# Patient Record
Sex: FEMALE | Race: ASIAN
[De-identification: names, ages, dates, MRNs, and addresses within clinical notes are randomized per-mention and may not be internally consistent; named-entity substitution may affect disease eponyms.]

---

## 2017-07-06 ENCOUNTER — HOSPITAL ENCOUNTER (EMERGENCY)
Dept: HOSPITAL 74 - JER | Age: 37
Discharge: HOME | End: 2017-07-06
Payer: COMMERCIAL

## 2017-07-06 VITALS — BODY MASS INDEX: 21.3 KG/M2

## 2017-07-06 DIAGNOSIS — N83.202: ICD-10-CM

## 2017-07-06 DIAGNOSIS — R10.32: Primary | ICD-10-CM

## 2017-07-06 LAB
ALBUMIN SERPL-MCNC: 4.2 G/DL (ref 3.4–5)
ALP SERPL-CCNC: 40 U/L (ref 45–117)
ALT SERPL-CCNC: 15 U/L (ref 12–78)
ANION GAP SERPL CALC-SCNC: 7 MMOL/L (ref 8–16)
APPEARANCE UR: CLEAR
AST SERPL-CCNC: 17 U/L (ref 15–37)
BASOPHILS # BLD: 0.9 % (ref 0–2)
BILIRUB SERPL-MCNC: 1 MG/DL (ref 0.2–1)
BILIRUB UR STRIP.AUTO-MCNC: NEGATIVE MG/DL
CALCIUM SERPL-MCNC: 9.1 MG/DL (ref 8.5–10.1)
CO2 SERPL-SCNC: 26 MMOL/L (ref 21–32)
COLOR UR: (no result)
CREAT SERPL-MCNC: 0.7 MG/DL (ref 0.55–1.02)
DEPRECATED RDW RBC AUTO: 13.6 % (ref 11.6–15.6)
EOSINOPHIL # BLD: 7 % (ref 0–4.5)
GLUCOSE SERPL-MCNC: 90 MG/DL (ref 74–106)
KETONES UR QL STRIP: NEGATIVE
LEUKOCYTE ESTERASE UR QL STRIP.AUTO: (no result)
MCH RBC QN AUTO: 29.8 PG (ref 25.7–33.7)
MCHC RBC AUTO-ENTMCNC: 33.4 G/DL (ref 32–36)
MCV RBC: 89.2 FL (ref 80–96)
NEUTROPHILS # BLD: 51.1 % (ref 42.8–82.8)
NITRITE UR QL STRIP: NEGATIVE
PH UR: 7 [PH] (ref 5–8)
PLATELET # BLD AUTO: 200 K/MM3 (ref 134–434)
PMV BLD: 8.5 FL (ref 7.5–11.1)
PROT SERPL-MCNC: 7 G/DL (ref 6.4–8.2)
PROT UR QL STRIP: NEGATIVE
PROT UR QL STRIP: NEGATIVE
RBC # BLD AUTO: 1 /HPF (ref 0–3)
RBC # UR STRIP: NEGATIVE /UL
SP GR UR: 1.01 (ref 1–1.02)
UROBILINOGEN UR STRIP-MCNC: (no result) E.U./DL (ref 0.2–1)
WBC # BLD AUTO: 9.4 K/MM3 (ref 4–10)
WBC # UR AUTO: 20 /HPF (ref 3–5)

## 2017-07-06 PROCEDURE — 3E0333Z INTRODUCTION OF ANTI-INFLAMMATORY INTO PERIPHERAL VEIN, PERCUTANEOUS APPROACH: ICD-10-PCS
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
## 2017-07-07 VITALS — TEMPERATURE: 98.5 F | SYSTOLIC BLOOD PRESSURE: 120 MMHG | DIASTOLIC BLOOD PRESSURE: 73 MMHG | HEART RATE: 62 BPM

## 2019-10-24 ENCOUNTER — HOSPITAL ENCOUNTER (INPATIENT)
Dept: HOSPITAL 74 - JER | Age: 39
LOS: 1 days | Discharge: HOME | DRG: 387 | End: 2019-10-25
Attending: FAMILY MEDICINE | Admitting: FAMILY MEDICINE
Payer: COMMERCIAL

## 2019-10-24 VITALS — BODY MASS INDEX: 22.1 KG/M2

## 2019-10-24 DIAGNOSIS — N83.201: ICD-10-CM

## 2019-10-24 DIAGNOSIS — R10.31: ICD-10-CM

## 2019-10-24 DIAGNOSIS — K50.00: Primary | ICD-10-CM

## 2019-10-24 DIAGNOSIS — D72.1: ICD-10-CM

## 2019-10-24 LAB
ALBUMIN SERPL-MCNC: 4.3 G/DL (ref 3.4–5)
ALP SERPL-CCNC: 49 U/L (ref 45–117)
ALT SERPL-CCNC: 15 U/L (ref 13–61)
ANION GAP SERPL CALC-SCNC: 7 MMOL/L (ref 8–16)
APPEARANCE UR: CLEAR
AST SERPL-CCNC: 15 U/L (ref 15–37)
BASOPHILS # BLD: 0.9 % (ref 0–2)
BILIRUB SERPL-MCNC: 0.6 MG/DL (ref 0.2–1)
BILIRUB UR STRIP.AUTO-MCNC: NEGATIVE MG/DL
BUN SERPL-MCNC: 15.9 MG/DL (ref 7–18)
CALCIUM SERPL-MCNC: 9.1 MG/DL (ref 8.5–10.1)
CHLORIDE SERPL-SCNC: 106 MMOL/L (ref 98–107)
CO2 SERPL-SCNC: 27 MMOL/L (ref 21–32)
COLOR UR: YELLOW
CREAT SERPL-MCNC: 0.8 MG/DL (ref 0.55–1.3)
DEPRECATED RDW RBC AUTO: 13.2 % (ref 11.6–15.6)
EOSINOPHIL # BLD: 12.3 % (ref 0–4.5)
GLUCOSE SERPL-MCNC: 83 MG/DL (ref 74–106)
HCT VFR BLD CALC: 38.2 % (ref 32.4–45.2)
HGB BLD-MCNC: 12.8 GM/DL (ref 10.7–15.3)
INR BLD: 1.13 (ref 0.83–1.09)
KETONES UR QL STRIP: (no result)
LEUKOCYTE ESTERASE UR QL STRIP.AUTO: NEGATIVE
LYMPHOCYTES # BLD: 36 % (ref 8–40)
MCH RBC QN AUTO: 30 PG (ref 25.7–33.7)
MCHC RBC AUTO-ENTMCNC: 33.5 G/DL (ref 32–36)
MCV RBC: 89.6 FL (ref 80–96)
MONOCYTES # BLD AUTO: 10 % (ref 3.8–10.2)
NEUTROPHILS # BLD: 40.8 % (ref 42.8–82.8)
NITRITE UR QL STRIP: NEGATIVE
PH UR: 6.5 [PH] (ref 5–8)
PLATELET # BLD AUTO: 241 K/MM3 (ref 134–434)
PMV BLD: 8.4 FL (ref 7.5–11.1)
POTASSIUM SERPLBLD-SCNC: 4.4 MMOL/L (ref 3.5–5.1)
PROT SERPL-MCNC: 6.9 G/DL (ref 6.4–8.2)
PROT UR QL STRIP: NEGATIVE
PROT UR QL STRIP: NEGATIVE
PT PNL PPP: 13.3 SEC (ref 9.7–13)
RBC # BLD AUTO: 4.26 M/MM3 (ref 3.6–5.2)
SODIUM SERPL-SCNC: 139 MMOL/L (ref 136–145)
SP GR UR: 1.01 (ref 1.01–1.03)
UROBILINOGEN UR STRIP-MCNC: 0.2 MG/DL (ref 0.2–1)
WBC # BLD AUTO: 8.6 K/MM3 (ref 4–10)

## 2019-10-24 RX ADMIN — SODIUM CHLORIDE, POTASSIUM CHLORIDE, SODIUM LACTATE AND CALCIUM CHLORIDE SCH MLS/HR: 600; 310; 30; 20 INJECTION, SOLUTION INTRAVENOUS at 21:53

## 2019-10-24 NOTE — CONSULT
Consult


Consult Specialty:: Surgery


Reason for Consultation:: abdominal pain





- History of Present Illness


Chief Complaint: abdominal pain


History of Present Illness: 





The patient is a 39 year old female, with a significant PMH of right ovarian 

cysts, L4-L5 disc herniation, who presents to the ED for evaluation of 

abdominal pain for 2 days. Patient notes sudden onset lower abdominal pain that 

began yesterday morning, which she describes as stabbing, is a 7/10 in nature, 

and is worse with eating. Patient endorses associated nausea, dizziness, and 

headache. Otherwise, denies any complaints.  LMP was last week, denies any 

sexual activity.Patient admits to have history of ruptured ovarian cyst 

mimicking diverticulitis 





- History Source


History Provided By: Patient





- Past Medical History


...LMP Comment: last week





- Alcohol/Substance Use


Hx Alcohol Use: No





- Smoking History


Smoking history: Never smoked





Home Medications





- Allergies


Allergies/Adverse Reactions: 


 Allergies











Allergy/AdvReac Type Severity Reaction Status Date / Time


 


cyclobenzaprine HCl Allergy Intermediate Rash Verified 07/06/17 17:46





[From Flexeril]     














- Home Medications


Home Medications: 


Ambulatory Orders





Cholecalciferol (Vitamin D3) [Vitamin D3 -] 1,000 unit PO DAILY 10/24/19 











Physical Exam


Vital Signs: 


 Vital Signs











Temperature  98.2 F   10/24/19 18:05


 


Pulse Rate  67   10/24/19 18:05


 


Respiratory Rate  16   10/24/19 18:05


 


Blood Pressure  116/74   10/24/19 18:05


 


O2 Sat by Pulse Oximetry (%)  100   10/24/19 18:05











Constitutional: Yes: Well Nourished, No Distress


Eyes: Yes: Conjunctiva Clear


HENT: Yes: Normocephalic


Neck: Yes: Supple


Cardiovascular: Yes: Regular Rate and Rhythm


Respiratory: Yes: CTA Bilaterally


Gastrointestinal: Yes: Soft, Tenderness (direct tenderness at RLQ but no 

rebound tenderness)


...Rectal Exam: Yes: Deferred


Labs: 


 CBC, BMP





 10/24/19 19:25 





 10/24/19 19:25 











Problem List





- Problems


(1) Abdominal pain


Assessment/Plan: 


r/o acute appendicitis


CT scan of the abdomen and pelvis 


NPO, IVF


Code(s): R10.9 - UNSPECIFIED ABDOMINAL PAIN

## 2019-10-24 NOTE — PDOC
Documentation entered by Jannette Lanier SCRIBE, acting as scribe for Lisa Laurent DO.








Lisa Laurent DO:  This documentation has been prepared by the Yannick borrego Adrianna, SCRIBE, under my direction and personally reviewed by me in its 

entirety.  I confirm that the documentation accurately reflects all work, 

treatment, procedures, and medical decision making performed by me.  





Attending Attestation





- Resident


Resident Name: VicenteBrittni





- ED Attending Attestation


I have performed the following: I have examined & evaluated the patient, The 

case was reviewed & discussed with the resident, I agree w/resident's findings 

& plan, Exceptions are as noted





- HPI


HPI: 


The patient is a 39 year old female, with a significant PMH of right ovarian 

cysts, L4-L5 disc herniation, who presents to the ED for evaluation of 

abdominal pain for 2 days. Patient notes sudden onset lower abdominal pain that 

began yesterday morning, which she describes as stabbing, is a 7/10 in nature, 

and is worse with eating. Patient endorses associated nausea, dizziness, and 

headache. Otherwise, denies any complaints.  LMP was last week, denies any 

sexual activity. 





Allergies: cyclobenzaprine HCl


Surgical History: Parathyroidectomy 


Social History: Denies EtOH, tobacco, or illicit drug use 


PCP: Dr. Ramos





- Physicial Exam


PE: 


 Constitutional: Awake, alert, oriented.  No acute distress.


Head:  Normocephalic.  Atraumatic


Eyes:  EOMI.  Conjunctivae are not pale.


ENT: Mucous membranes are moist and intact. 


Neck:  Supple.  Full ROM. No lymphadenopathy.


Cardiovascular:  Regular rate.  Regular rhythm. S1, S2 regular.  Distal pulses 

are 2+ and symmetric.  


Pulmonary/Chest:  No evidence of respiratory distress.  Clear to auscultation 

bilaterally  No wheezing, rales or rhonchi.


Abdominal: +Tender to palpation over McBurnys. +Left lower pelvis tenderness to 

palpation (patient has an ovarian cyst at this site).  Soft and non-distended. 

No rebound, guarding or rigidity.  No organomegaly. No palpable masses. Good 

bowel sounds.


Back: +Left CVA tenderness.


Musculoskeletal:  No edema.  No cyanosis.  No clubbing.  Full range of motion 

in all extremities.  Nocalf tenderness. Radial/pedal pulses are intact and 2+ 

bilaterally


Skin: +Well-healed anterior scar over the throat from a parathyroidectomy.  

Skin is warm and dry.  No petechiae.  No purpura.  


Neurological:  Alert and oriented to person, place, and time.  Cranial nerves II

-XII are grossly intact. Normal speech. Strength is grossly symmetric. No 

sensory deficits.


Psychiatric:  Good eye contact.  Normal interaction, affect and behavior.


10/24/19 19:44








- Medical Decision Making





10/24/19 19:44








I, Dr. Lisa Laurent, DO, attest that this document has been prepared under 

my direction and personally reviewed by me in its entirety.   I further attest, 

that it accurately reflects all work, treatment, procedures and medical decision

-making performed by me.  





a/p: 40yo female with abd pain and nausea that started today


-sent from Dr. Villagomez for eval of poss appy


-RLQ pain, also LLQ pain/l cva ttp


-no v/d


-at [facility] at 4p


-pt with pain over mc burneys point, no rebound ttp, also L pelvic pain and L 

cva pain


-hx of ovarian cysts as well


-will send labs


-bedside ultrasound shows enlarged noncompressible appy


-will send for ct


-Family requests Dr. Shrestha





10/24/19 19:46


case discussed with Dr. Shrestha who is at the bedside to see the patient based on 

ultrasound results, but requests ct


10/24/19 19:57


no elevated wbc


ua pending, chem pending


ct pending


10/24/19 23:25


inflamed terminal ileum - dr. shrestha updated, not acute appy


call placed to Dr. Damon


10/24/19 23:30


dr shrestha updated


resident updated the fam/pt and currently is discussing the case with Dr. Damon


10/25/19 00:14


resident discussed the case with [facility] who accepts pt to service





**Heart Score/ECG Review





- ECG Intrepretation


Comment:: 





10/24/19 19:47


sinus at 64, nl axis, incomplete rbbb, no acute st/t wave findings





ED Treatment Course





- LABORATORY


CBC & Chemistry Diagram: 


 10/24/19 19:25





 10/24/19 19:25





- ADDITIONAL ORDERS


Additional order review: 


 Laboratory  Results











  10/24/19 10/24/19 10/24/19





  21:52 19:25 19:25


 


PT with INR   


 


INR   


 


Sodium   


 


Potassium   


 


Chloride   


 


Carbon Dioxide   


 


Anion Gap   


 


BUN   


 


Creatinine   


 


Est GFR (CKD-EPI)AfAm   


 


Est GFR (CKD-EPI)NonAf   


 


Random Glucose   


 


Calcium   


 


Total Bilirubin   


 


AST   


 


ALT   


 


Alkaline Phosphatase   


 


Total Protein   


 


Albumin   


 


Serum Pregnancy, Qual    Negative


 


Urine Color  Yellow  


 


Urine Appearance  Clear  


 


Urine pH  6.5  


 


Ur Specific Gravity  1.007 L  


 


Urine Protein  Negative  


 


Urine Glucose (UA)  Negative  


 


Urine Ketones  Trace H  


 


Urine Blood  Negative  


 


Urine Nitrite  Negative  


 


Urine Bilirubin  Negative  


 


Urine Urobilinogen  0.2  


 


Ur Leukocyte Esterase  Negative  


 


Blood Type   O POSITIVE 


 


Antibody Screen   Negative 














  10/24/19 10/24/19





  19:25 19:25


 


PT with INR  13.30 H 


 


INR  1.13 H 


 


Sodium   139


 


Potassium   4.4


 


Chloride   106


 


Carbon Dioxide   27


 


Anion Gap   7 L


 


BUN   15.9


 


Creatinine   0.8


 


Est GFR (CKD-EPI)AfAm   109.16


 


Est GFR (CKD-EPI)NonAf   94.18


 


Random Glucose   83


 


Calcium   9.1


 


Total Bilirubin   0.6


 


AST   15


 


ALT   15


 


Alkaline Phosphatase   49


 


Total Protein   6.9


 


Albumin   4.3


 


Serum Pregnancy, Qual  


 


Urine Color  


 


Urine Appearance  


 


Urine pH  


 


Ur Specific Gravity  


 


Urine Protein  


 


Urine Glucose (UA)  


 


Urine Ketones  


 


Urine Blood  


 


Urine Nitrite  


 


Urine Bilirubin  


 


Urine Urobilinogen  


 


Ur Leukocyte Esterase  


 


Blood Type  


 


Antibody Screen  








 











  10/24/19





  19:25


 


RBC  4.26


 


MCV  89.6


 


MCHC  33.5


 


RDW  13.2


 


MPV  8.4


 


Neutrophils %  40.8 L D


 


Lymphocytes %  36.0


 


Monocytes %  10.0


 


Eosinophils %  12.3 H


 


Basophils %  0.9














- RADIOLOGY


Radiograph Interpretation: 


EXAM#: TYPE/EXAM: RESULT: [id number] CT/ABDOMEN PELVIS CT WITH CONTR HISTORY 

PROVIDED: Rule out appendicitis. 


IMPRESSION: 1. Thickening and irregularity of the terminal ileum suspicious for 

Crohn's disease. 2. No CT evidence of appendicitis. Please see above 

discussion. 


Reported By: Ja Mayer MD 10/24/19 23:20

## 2019-10-24 NOTE — PN
Teaching Attending Note


Name of Resident: Josie Roman





ATTENDING PHYSICIAN STATEMENT





I saw and evaluated the patient.


I reviewed the resident's note and discussed the case with the resident.


I agree with the resident's findings and plan as documented.








SUBJECTIVE:


Patient is a 39 year old woman with a PMH of Right ovarian cysts, L4-L5 disc 

herniation and Parathyroidectomy who presents to the ER with abdominal pain for 

2 days. Patient notes sudden onset of lower abdominal pain that began yesterday 

morning, which she describes as stabbing, is a 7/10 in nature, and is worse 

with eating. Patient has associated nausea, dizziness, and headache. Denies 

fever, chills, dysuria, frequency or urgency. LMP was last week, denies any 

recent sexual activity. Patient admits to have history of ruptured ovarian cyst 

mimicking diverticulitis. Denies alcohol, tobacco, or illicit drug use 





OBJECTIVE:


Alert


 Vital Signs











 Period  Temp  Pulse  Resp  BP Sys/Coulter  Pulse Ox


 


 Last 24 Hr  98.2 F  67  16  116/74  100








HEENT: No Jaundice, eye redness or discharge, PERRLA, EOMI. Normocephalic, 

atraumatic. External ears are normal and hearing is grossly intact. No nasal 

discharge.


Neck: Supple, nontender. No palpable adenopathy or thyromegaly. No JVD


Chest: Good effort. Clear to auscultation and percussion.


Heart: Regular. No S3, rub or murmur


Abdomen: Not distended, soft, nontender and no HSM. No rebound or guarding.  

Normal bowel sounds.


Ext: Peripheral pulses intact. No leg edema.


Skin: Warm and dry. No petechiae, rash or ecchymosis.


Neuro: Alert. Oriented x3. CN 2-12 grossly intact. Sensation grossly intact in 

all four extremities and DTR are symmetric.


Psych: Appropriate mood and affect. Good insight.





 Current Medications











Generic Name Dose Route Start Last Admin





  Trade Name Freq  PRN Reason Stop Dose Admin


 


Lactated Ringer's  1,000 ml in 1,000 mls @ 83 mls/hr  10/24/19 21:15  10/24/19 

21:53





  Lactated Ringers Solution  IV   83 mls/hr





  ASDIR DARIN   Administration





     





     





     





     








 Home Medications











 Medication  Instructions  Recorded


 


Cholecalciferol (Vitamin D3) 1,000 unit PO DAILY 10/24/19





[Vitamin D3 -]  








 Abnormal Lab Results











  10/24/19 10/24/19 10/24/19





  19:25 19:25 19:25


 


Neutrophils %  40.8 L D  


 


Eosinophils %  12.3 H  


 


PT with INR    13.30 H


 


INR    1.13 H


 


Anion Gap   7 L 


 


Ur Specific Gravity   


 


Urine Ketones   














  10/24/19





  21:52


 


Neutrophils % 


 


Eosinophils % 


 


PT with INR 


 


INR 


 


Anion Gap 


 


Ur Specific Gravity  1.007 L


 


Urine Ketones  Trace H











ASSESSMENT AND PLAN:


1. Terminal Ileitis/?Crohn's disease - 





CT scan of the abdomen and pelvis with IV and oral contrast showed "thickening 

and irregularity of the terminal ileum suspicious for Crohn's disease. No CT 

evidence of appendicitis." Will continue comprehensive care for all of patient

s comorbid conditions.





2. DVT prophylaxis - Lovenox 40 mg SQ q 24 hours.    





3. Advance directives - Full code

## 2019-10-24 NOTE — PDOC
History of Present Illness





- General


Chief Complaint: Pain, Acute


Stated Complaint: APPENDICITIS


Time Seen by Provider: 10/24/19 18:33


History Source: Patient





- History of Present Illness


Initial Comments: 





10/24/19 19:04


38 yo F with Hx of R ovarian cyst, L4-L5 herniated disc presented to the ED 

from her PMD office ( Dr. Ramos) for abdominal pain. Pt states that 

this pain began early yesterday in the lower abdomen. pt states that nothing 

makes the pain better or worse. Pt describes a stabbing pain 7/10 intensity. pt 

states the pain does not radiate. Pt endorses nausea but denies vomiting, 

diarrhea, constipation. Pt states she has dizziness, headaches. Pt states her 

LMP was last week. Pt denies sexual activity. Pt denies dysuria, hematuria, 

hematochezia. 


Modifying Factors: worse with: eating, medication (has not tried taking 

anything for pain)





Past History





- Past Medical History


Allergies/Adverse Reactions: 


 Allergies











Allergy/AdvReac Type Severity Reaction Status Date / Time


 


cyclobenzaprine HCl Allergy Intermediate Rash Verified 07/06/17 17:46





[From Flexeril]     











Home Medications: 


Ambulatory Orders





Cholecalciferol (Vitamin D3) [Vitamin D3 -] 1,000 unit PO DAILY 10/24/19 








COPD: No


CHF: No


DVT: No


Dementia: No





- Surgical History


Comments:: 





10/24/19 19:12


Parathyroidectomy, 2015


10/24/19 19:12








- Reproductive History


LMP comment: last week


Comment:: 





10/24/19 19:13


Hx of R ovarian cyst





- Immunization History


Immunization Up to Date: Yes





- Psycho Social/Smoking Cessation Hx


Smoking Status: No


Smoking History: Never smoked


Hx Alcohol Use: No


Drug/Substance Use Hx: No





**Review of Systems





- Review of Systems


Able to Perform ROS?: Yes


Constitutional: Yes: Weight Stable.  No: Chills, Diaphoresis, Fever, Loss of 

Appetite, Weakness, Unintentional Wgt. Loss, Unexplained wgt Loss


Respiratory: No: Shortness of Breath


Cardiac (ROS): Yes: Lightheadedness.  No: Chest Pain


ABD/GI: Yes: Nausea, Abdominal cramping.  No: Blood Streaked Bowels, Constipated

, Diarrhea, Poor Appetite, Rectal Bleeding, Vomiting, Tarry Stools


: Yes: Flank Pain.  No: Dysuria, Discharge, Frequency, Incontinence


Musculoskeletal: Yes: Back Pain


Endocrine: No: Change in Weight





*Physical Exam





- Vital Signs


 Last Vital Signs











Temp Pulse Resp BP Pulse Ox


 


 98.2 F   67   16   116/74   100 


 


 10/24/19 18:05  10/24/19 18:05  10/24/19 18:05  10/24/19 18:05  10/24/19 18:05














- Physical Exam


General Appearance: Yes: Nourished, Appropriately Dressed


HEENT: positive: EOMI, Normal Voice, Pharynx Normal.  negative: Pharyngeal 

Erythema


Neck: positive: Normal Thyroid, Lymphadenopathy (L)


Respiratory/Chest: positive: Lungs Clear, Normal Breath Sounds.  negative: 

Crackles, Rales, Rhonchi, Stridor, Wheezing


Cardiovascular: positive: Regular Rhythm, Regular Rate, S1, S2


Gastrointestinal/Abdominal: positive: Normal Bowel Sounds, Tender (RLQ, LLQ, 

suprapubic tenderness to palpation . + Manriquez sign , + Left CVA tenderness ), 

Soft, Tenderness.  negative: Distended, Hepatomegaly


Rectal Exam: positive: normal exam, normal rectal tone, other (No stool in vault

, good sphincter tone, no external hemorrhoid visualized, no internal 

hemorrhoid felt , no active bleeding noted, no blood on tip of glove ).  

negative: melena


Musculoskeletal: positive: CVA Tenderness (L)


Extremity: positive: Normal Capillary Refill, Normal Inspection


Integumentary: positive: Normal Color, Dry, Warm


Neurologic: positive: Fully Oriented





ED Treatment Course





- LABORATORY


CBC & Chemistry Diagram: 


 10/24/19 19:25





 10/24/19 19:25





Medical Decision Making





- Medical Decision Making





10/24/19 19:23


38 yo F presented to the ED with lower abdominal pain from PMD





-r/o appendicitis 


-r/o ovarian cyst


-r/o pyelonephritis 





-CT abdomen pelvis with oral contrast


-bedside u/s suggestive of appendicitis, + ultrasonographic murphys sign. 

Surgery consulted


-+ CVA tenderness on L. awaiting Ucx. 


-pending CMP, CBC, lipase


-IVF


-pain mgmt prn


-NPO after midnight in case of procedure


-EKG reviewed, NSR 


10/24/19 19:55











10/24/19 23:34


reviewed CT findings, possible Crohn's disease 





CT findings: There is marked thickening and irregularity of the terminal ileum. 

This appearance is suspicious for Crohn's disease. Clinical correlation is 

advised. No CT evidence of appendicitis. The liver, spleen, pancreas, adrenal 

glands and kidneys demonstrate no significant abnormalities. There are a few 

small hypodensities within the liver that most likely represent cysts, however, 

they 're too small to accurately characterize. The gallbladder is clear. 





Spoke with GI on call (Dr. Damon). unclear whether pt has Crohns vs 

mesenteric ileitis. recommends keeping NPO, IVF. admit to GI service. possible 

colonoscopy outpt or next week.








10/24/19 23:59


Rectal exam performed. see PE. FOBT sent 








Discharge





- Follow up/Referral


Referrals: 


Rosita Rmaos MD [Primary Care Provider] - 





- Patient Discharge Instructions





- Post Discharge Activity

## 2019-10-25 VITALS — TEMPERATURE: 98.3 F | DIASTOLIC BLOOD PRESSURE: 62 MMHG | SYSTOLIC BLOOD PRESSURE: 103 MMHG | HEART RATE: 62 BPM

## 2019-10-25 RX ADMIN — SODIUM CHLORIDE, POTASSIUM CHLORIDE, SODIUM LACTATE AND CALCIUM CHLORIDE SCH MLS/HR: 600; 310; 30; 20 INJECTION, SOLUTION INTRAVENOUS at 09:06

## 2019-10-25 NOTE — CON.GI
Consult


Consult Specialty:: GI


Referred by:: Dr. Rosita Ramos


Reason for Consultation:: Abdominal pain





- History of Present Illness


Chief Complaint: Back pain and abdominal pain


History of Present Illness: 





39F admitted through [facility] for evaluation of 2 days of back pain stiffness.  She 

was seen by her PMD, abdominal tenderness was elicitted during her exam and she 

was sent to the ER.  CT scan revealed ileitis.  She denies chronic abdominal 

complaints, diarrhea, rectal bleeding, abdominal pain. Peripheral eosinophils 

were elevated and she states they have been elevated since 2017.  She had an 

uneventful trip to  There is no family history of Crohn's / IBD.  She believes 

that her lower back pain has been attributed to discogenic disease in her 

lumbar spine.  She was noted guaiac negative in the ED. Back pain has improved. 

She has never had an upper endoscopy or colonoscopy.  There is no family 

history of colon cancer or other GI malignancy. 


 


     





- History Source


History Provided By: Patient, Family Member (Mother present at bedside)





- Past Medical History


Reproductive: Yes: Other (Ovarian cysts)


...LMP Comment: last week


Endocrine: Yes: Hyperparathyroidism





- Past Surgical History


Additional Surgical History: Hyperparathyroidectomy





- Alcohol/Substance Use


Hx Alcohol Use: Yes (rare)


History of Substance Use: reports: None





- Smoking History


Smoking history: Never smoked





- Social History


Usual Living Arrangement: Alone


ADL: Independent


Occupation: Occupational Therapy Assistant


Place of Birth: [place]


History of Recent Travel: Yes ([facility] )





Home Medications





- Allergies


Allergies/Adverse Reactions: 


 Allergies











Allergy/AdvReac Type Severity Reaction Status Date / Time


 


cyclobenzaprine HCl Allergy Intermediate Rash Verified 17 17:46





[From Flexeril]     














- Home Medications


Home Medications: 


Ambulatory Orders





Cholecalciferol (Vitamin D3) [Vitamin D3 -] 1,000 unit PO DAILY 10/24/19 











Family Medical History


Other Family History: Mother: Alive: Healthy.  Father: : murdered.  1 

sister: healthy.  No children.  No fammily history of IBD/Colorectal cancer





Review of Systems





- Review of Systems


Constitutional: denies: Fever, Night Sweats, Unintentional Wgt. Loss


Cardiovascular: denies: Chest Pain


Respiratory: denies: Cough


Gastrointestinal: reports: Abdominal Pain, Nausea.  denies: Constipation, 

Diarrhea, Melena, Rectal Bleeding, Vomiting Blood





Physical Exam-GI


Vital Signs: 


 Vital Signs











Temperature  97.9 F   10/25/19 06:00


 


Pulse Rate  58 L  10/25/19 06:00


 


Respiratory Rate  16   10/25/19 06:00


 


Blood Pressure  99/62   10/25/19 06:00


 


O2 Sat by Pulse Oximetry (%)  99   10/25/19 04:00











Constitutional: Yes: Calm


Eyes: No: Sclera Icterus


Cardiovascular: Yes: Regular Rate and Rhythm.  No: Murmur


Respiratory: Yes: CTA Bilaterally


Gastrointestinal Inspection: No: Distention


...Auscultate: Yes: Normoactive Bowel Sounds


...Palpate: Yes: Tenderness (TTP RLQ).  No: Hepatomegaly, Splenomegaly


...Percussion: No: Tympanitic


Edema: No (No LE edema)


Neurological: Yes: Alert


Labs: 


 CBC, BMP





 10/24/19 19:25 





 10/24/19 19:25 





 INR, PTT











INR  1.13  (0.83-1.09)  H  10/24/19  19:25    








 Hepatic Panel











Total Bilirubin  0.6 mg/dL (0.2-1)   10/24/19  19:25    


 


AST  15 U/L (15-37)   10/24/19  19:25    


 


ALT  15 U/L (13-61)   10/24/19  19:25    


 


Alkaline Phosphatase  49 U/L ()   10/24/19  19:25    


 


Albumin  4.3 g/dl (3.4-5.0)   10/24/19  19:25    














Imaging





- Results


Cat Scan: Report Reviewed, Image Reviewed





Problem List





- Problems


(1) Ileitis, terminal


Assessment/Plan: 


No chronic bowel symptomatology: ? self limited ileitis, ? Crohn's


Periphheral eosinophilia can accompany IBD/Crohn's, however hematology 

evaluation would be reasonable. Ordered Stool for O&P, Strongyloides Ab.


Advanced diet to full liquids then as tolerated


Levaquin/Flagyl started. 5 day course


Close outpatient follow-up if continuing to improve and will need follow-up 

colonoscopy for further evaluation and tissue diagnosis.  If continued clinical 

improvement, this can be performed on an outpatient basis. Ordered Screening 

hepatitis B serologies, IBD serologies, CRP and quantiferon gold in preparation 

for possible crohn's biologic therapy.  


  











Code(s): K50.00 - CROHN'S DISEASE OF SMALL INTESTINE WITHOUT COMPLICATIONS

## 2019-10-25 NOTE — PN
Progress Note, Physician





- Current Medication List


Current Medications: 


Active Medications





Acetaminophen (Tylenol -)  650 mg PO Q6H PRN


   PRN Reason: Fever Or Pain


Lactated Ringer's (Lactated Ringers Solution)  1,000 ml in 1,000 mls @ 83 mls/

hr IV ASDIR DARIN


   Last Admin: 10/25/19 09:06 Dose:  83 mls/hr


Levofloxacin (Levaquin -)  500 mg PO DAILY DARIN


Metronidazole (Flagyl -)  500 mg PO TID DARNI











- Objective


Vital Signs: 


 Vital Signs











Temperature  98.2 F   10/25/19 10:00


 


Pulse Rate  66   10/25/19 10:00


 


Respiratory Rate  16   10/25/19 10:00


 


Blood Pressure  105/66   10/25/19 10:00


 


O2 Sat by Pulse Oximetry (%)  99   10/25/19 09:00











Labs: 


 CBC, BMP





 10/24/19 19:25 





 10/24/19 19:25 





 INR, PTT











INR  1.13  (0.83-1.09)  H  10/24/19  19:25    














Problem List





- Problems


(1) Abdominal pain


Code(s): R10.9 - UNSPECIFIED ABDOMINAL PAIN

## 2019-10-25 NOTE — HP
DATE OF ADMISSION:  10/24/2019

 

HISTORY:  Patient is a 39-year-old female with a past medical history of problem on

the back and parathyroidectomy into the emergency room for evaluation of right lower

quadrant pain.  Patient has been in the office with the complaints of slight

abdominal pain right lower quadrant and during the examination found abdominal

tenderness and sent to the ER for evaluation and to rule out appendicitis.  She

complains of mild right lower quadrant pain radiating to the back.  No history of

diarrhea.  Mild nausea present.  No rectal bleeding.  No urinary symptoms.  Patient

had history of discogenic disease in the lower back and in the ER stool guaiac was

negative.  There is no family history of any GI malignancy or Crohn disease or

inflammatory bowel disease.

 

FAMILY HISTORY:  Nothing significant.

 

PAST MEDICAL HISTORY:  History of parathyroidectomy 

 

SURGICAL HISTORY:  Parathyroidectomy.

 

PERSONAL HISTORY:  Nothing significant.

 

ALLERGIES:  She has allergy to FLEXERIL.

 

MEDICATIONS:  Patient is taking vitamin D.

 

REVIEW OF SYSTEMS: 

Constitutional:  Patient denies any fever, night sweats, or unintentional weight

loss.  

Cardiovascular:  No history of chest pain.  

Respiratory:  No cough.  

Gastrointestinal:  Reports abdominal pain and nausea.  Denies constipation, diarrhea,

or bleeding from rectum, vomiting. 

 

PHYSICAL EXAMINATION: 

Vital Signs:  On examination of the patient in the emergency room, temperature is

98.1, pulse rate 61, blood pressure 113/70, respiratory rate 16, saturation 98.

HEENT:  Head normal.

Neck:  Supple.  No JVD.

Chest:  Clear. 

Cardiovascular:  First and 2nd sounds normal.  

Abdomen:  Slight tenderness in the right lower quadrant.  Normoactive bowel sounds. 

No guarding, no tenderness, no hepatomegaly, no splenomegaly. 

Extremities:  No edema.

Neurologic:  Alert and oriented x3.  No apparent motor or sensory deficits.  Reflexes

normal.

 

Patient had labs done.  WBC 8.6, hemoglobin 12.8, hematocrit 38.2, platelets 241,

neutrophils 40.8, eosinophils 12.3.  Chemistry normal.  AST, ALT normal.  Serum

pregnancy test negative.  PT 13.3, INR 1.13.  UA:  Ketones trace.  Stool occult

negative.  CT of the abdomen and pelvis done shows thickening and irregularity of the

terminal ileum suspicious for Crohn disease.  No evidence of appendicitis.  Patient

is seen by the surgeon who ruled out appendicitis.  Gastroenterologist seeing the

patient.  

 

IMPRESSION:  Ileitis, terminal _____ failure.  The patient was admitted to the floor

with admitting diagnosis of terminal ileitis. 

 

PLAN:  Stool studies.  Advance diet to full liquid as tolerated.  Flagyl and Levaquin

started.  I recommend close outpatient follow up and a colonoscopy as an outpatient

for further diagnosis.  Hepatitis B serologies, IBD serologies.  CRP and QuantiFERON

test ordered.  _____ of Crohn disease of small intestine without complications. 

Patient is stable on the floor.

 

 

DILIP DOWD M.D.

 

[id number]

DD: 10/25/2019 10:23

DT: 10/25/2019 14:24

Job #:  [id number]

## 2019-10-25 NOTE — EKG
Test Reason : 

Blood Pressure : ***/*** mmHG

Vent. Rate : 064 BPM     Atrial Rate : 064 BPM

   P-R Int : 122 ms          QRS Dur : 098 ms

    QT Int : 424 ms       P-R-T Axes : 006 021 053 degrees

   QTc Int : 437 ms

 

NORMAL SINUS RHYTHM

INCOMPLETE RIGHT BUNDLE BRANCH BLOCK

NO PREVIOUS ECGS AVAILABLE

Confirmed by MARKELL YAP MD ([id number]) on 10/25/2019 1:29:19 PM

 

Referred By:             Confirmed By:MARKELL YAP MD

## 2019-10-25 NOTE — PN
Progress Note (short form)





- Note


Progress Note: 





CT A/P showed terminal ilietis with no evidence of acute appendicitis


Patient has less pain and tolerating clear liquids


No surgical intervention necessary at this time








Problem List





- Problems


(1) Abdominal pain


Code(s): R10.9 - UNSPECIFIED ABDOMINAL PAIN

## 2019-10-25 NOTE — PN
Progress Note, Physician


Chief Complaint: 





Pt lying in bed


afebrile,abdominal pain improved


CT abd findings noted


GI consult appreciated


Rec to advance diet and if tolerates d/c home on oral antibiotics and f/u as 

outpatient and colonoscopynas outpatient





- Current Medication List


Current Medications: 


Active Medications





Acetaminophen (Tylenol -)  650 mg PO Q6H PRN


   PRN Reason: Fever Or Pain


Lactated Ringer's (Lactated Ringers Solution)  1,000 ml in 1,000 mls @ 83 mls/

hr IV ASDIR DARIN


   Last Admin: 10/25/19 09:06 Dose:  83 mls/hr


Influenza Virus Vaccine Quadrival (Flulaval Quad [id number])  60 mcg IM .ONCE ONE


   Stop: 10/25/19 10:24


Levofloxacin (Levaquin)  500 mg PO DAILY DARIN


Metronidazole (Flagyl -)  500 mg PO TID [facility]











- Objective


Vital Signs: 


 Vital Signs











Temperature  97.9 F   10/25/19 06:00


 


Pulse Rate  58 L  10/25/19 06:00


 


Respiratory Rate  16   10/25/19 06:00


 


Blood Pressure  99/62   10/25/19 06:00


 


O2 Sat by Pulse Oximetry (%)  99   10/25/19 04:00











Constitutional: Yes: No Distress


Eyes: Yes: Conjunctiva Clear


HENT: Yes: Atraumatic


Neck: Yes: Supple, Trachea Midline


Cardiovascular: Yes: Regular Rate and Rhythm


Respiratory: Yes: Regular, CTA Bilaterally


Gastrointestinal: Yes: Normal Bowel Sounds, Soft, Tenderness (rt lower quadrant 

on RT side)


Musculoskeletal: Yes: WNL


Extremities: Yes: WNL


Peripheral Pulses WNL: Yes


Neurological: Yes: WNL


...Motor Strength: WNL


Psychiatric: Yes: WNL


Labs: 


 CBC, BMP





 10/24/19 19:25 





 10/24/19 19:25 





 INR, PTT











INR  1.13  (0.83-1.09)  H  10/24/19  19:25    














- ....Imaging


Cat Scan: Report Reviewed





Assessment/Plan





Ileitis ,r/o crohns disease of ileum


Discogenic pain back


peripheral eosinophelia


PLAN


Continue antibiotics as re by GI


Advance diet as tolerated


Will f/u labs 


GI f/u


will discharge patient if she tolerates  diet on oral antibiotics and f/u with 

GI as OUTpatient for further W/U

## 2019-10-25 NOTE — CONSULT
Consult


Consult Specialty:: Hematology and oncology


Reason for Consultation:: Peripheral eosinophilia





- History of Present Illness


Chief Complaint: Abdominal pain


History of Present Illness: 





The patient is a 38 yo f w/ PMH hyperparathyroidism, ruptured ovarian cyst, 

migraines who was sent to the ED by her PCP for abdominal pain. A CT of the 

abdomen and pelvis showed inflammation in the terminal llium suspicious for 

inflammatory bowel disease. Hematology was consulted for the evaluation of a 

persistent eosinophilia. 








On interview, the patient was resting comfortably in her bed. Her abdominal 

pain is improved. Patient states that she has had a history of peripheral 

eosinophilia, but has not seen a hematologist for it and has not had a workup. 

She was sent to an allergist and was tested for allergies, all of which were 

negative. Of note, she endorses that her aunt and grandmother both had 

peripheral eosinophilia in the past. She denies any family history of blood 

disorders or cancers.  





- History Source


History Provided By: Patient, Family Member


Limitations to Obtaining History: No Limitations





- Past Medical History


...LMP Comment: last week


Endocrine: Yes: Hyperparathyroidism





- Past Surgical History


Additional Surgical History: Hyperparathyroidectomy





- Alcohol/Substance Use


Hx Alcohol Use: No


History of Substance Use: reports: None





- Smoking History


Smoking history: Never smoked





- Social History


Usual Living Arrangement: Alone


ADL: Independent


Occupation: Occupational Therapy Assistant


History of Recent Travel: Yes ([facility] 5/19)





Home Medications





- Allergies


Allergies/Adverse Reactions: 


 Allergies











Allergy/AdvReac Type Severity Reaction Status Date / Time


 


cyclobenzaprine HCl Allergy Intermediate Rash Verified 07/06/17 17:46





[From Flexeril]     














- Home Medications


Home Medications: 


Ambulatory Orders





Cholecalciferol (Vitamin D3) [Vitamin D3 -] 1,000 unit PO DAILY 10/24/19 


levoFLOXacin [Levaquin] 500 mg PO DAILY 7 Days #7 tab 10/25/19 


metroNIDAZOLE [Flagyl -] 500 mg PO TID #21 tablet 10/25/19 











Review of Systems





- Review of Systems


Constitutional: denies: Chills, Fever


Cardiovascular: denies: Chest Pain, Palpitations, Shortness of Breath


Respiratory: denies: Cough, SOB, SOB on Exertion, Wheezing


Gastrointestinal: reports: Abdominal Pain.  denies: Constipation, Diarrhea, 

Dysphagia


Genitourinary: denies: Burning, Discharge, Dysuria, Flank Pain


Hematology/Lymphatic: denies: Easily Bruised, Excessive Bleeding, Swollen Glands





Physical Exam


Vital Signs: 


 Vital Signs











Temperature  98.2 F   10/25/19 10:00


 


Pulse Rate  66   10/25/19 10:00


 


Respiratory Rate  16   10/25/19 10:00


 


Blood Pressure  105/66   10/25/19 10:00


 


O2 Sat by Pulse Oximetry (%)  99   10/25/19 09:00











Constitutional: Yes: Well Nourished, No Distress, Calm


HENT: Yes: Atraumatic, Normocephalic


Cardiovascular: Yes: Regular Rate and Rhythm, S1, S2.  No: Gallop, Murmur, Rub


Respiratory: Yes: Regular, CTA Bilaterally


Gastrointestinal: Yes: Normal Bowel Sounds, Soft


Edema: No


Neurological: Yes: Alert, Oriented, Cran Nerves II-XII Intact


Psychiatric: Yes: Alert, Oriented


Labs: 


 CBC, BMP





 10/24/19 19:25 





 10/24/19 19:25 











Assessment/Plan





The patient is a 38 yo f w/ PMH hyperparathyroidism, ruptured ovarian cyst, 

migraines who was sent to the ED by her PCP for abdominal pain. A CT of the 

abdomen and pelvis showed inflammation in the terminal llium suspicious for 

inflammatory bowel disease. Hematology was consulted for the evaluation of a 

persistent eosinophilia.





#peripheral eosinophilia


   -asymptomatic at this time


   -eos 12.3% this admission, was 7% in 2017


   -patient w/ family history of peripheral eosinophilia


   -It is possible that eosinophilia could be related to the patient's 

suspected inflammatory bowel disease. 


   -Patient will require outpatient stool for ova and parasite, strongloides, 

IgE

## 2019-10-26 NOTE — DS
DATE OF ADMISSION:  10/24/2019

 

DATE OF DISCHARGE:  10/25/2019

 

DATE OF DICTATION:  10/25/2019

 

The patient is a 39-year-old female admitted with right lower quadrant abdominal

pain.  

 

The patient has past medical history of parathyroidectomy and history of peripheral

eosinophilia in the past.

 

PAST SURGICAL HISTORY:  Parathyroidectomy.

 

ALLERGIES:  MUSCLE RELAXANT. 

 

MEDICATIONS:  The patient is taking vitamin D.

 

PHYSICAL EXAMINATION:  

Vital Signs:  Stable in the emergency room.  

Head and Neck:  Normal.  

Chest:  Clear.

Cardiovascular:  Normal.

Abdomen:  Soft.  Slight tenderness in the right lower quadrant.  No distention. 

Bowel sounds present. 

Extremities:  No edema.  

 

DIAGNOSTIC STUDIES:  The patient had an abdominal CT done in the emergency room that

showed thickening and irregularity of the terminal ileum, suspicious for Crohn

disease.  No CT evidence of appendicitis.  

 

HOSPITAL COURSE:  The patient was seen by Gastroenterology.  Impression was

self-limited ileitis, Crohn's, and peripheral eosinophilia which can accompany

inflammatory bowel disease or Crohn's.  The patient was started on Flagyl and

Levaquin empirically, and for the peripheral eosinophilia, Hematology consult

ordered.  Stool ova and parasites and _____ antibody ordered.  Recommended to advance

the diet and if the patient tolerates the diet can discharge home, according to

Gastroenterology, and will do a colonoscopy as an outpatient, and will follow up the

lab as an outpatient.  The patient was stable in the floor.  The patient was given IV

fluids.  The patient tolerated clear liquids, so discharged home in stable condition.

 

DISCHARGE MEDICATIONS:  

1.  Flagyl 500 mg 3 times a day. 

2.  Levaquin 500 mg daily for 7 days. 

 

DISCHARGE INSTRUCTIONS:  Recommended to see GI as an outpatient and follow up with

her primary in 1 week. 

 

 

DILIP DOWD M.D.

 

[id number]

DD: 10/25/2019 15:02

DT: 10/26/2019 07:41

Job #:  [id number]

## 2019-10-27 NOTE — PN
Teaching Attending Note


Name of Resident: Armin Burkett





ATTENDING PHYSICIAN STATEMENT





I saw and evaluated the patient.


I reviewed the resident's note and discussed the case with the resident.


I agree with the resident's findings and plan as documented.








ASSESSMENT AND PLAN:





The patient is a 38 yo f w/ PMH hyperparathyroidism, ruptured ovarian cyst, 

migraines who was sent to the ED by her PCP for abdominal pain. A CT of the 

abdomen and pelvis showed inflammation in the terminal llium suspicious for 

inflammatory bowel disease. 





#peripheral eosinophilia--1000 approx.


   ? related to infectious vs inflammatory vs allergiv process


check stoolova and parasitex x3 , chek strongyloides ab, IgE level


Check Quantiferon, ESR, CRP, RAJESH, RF


will need to follow up out patient --contact nos. given


discussed with patient and her mother

## 2019-10-27 NOTE — PN
Teaching Attending Note


Name of Resident: Armin Burkett





ATTENDING PHYSICIAN STATEMENT





I saw and evaluated the patient.


I reviewed the resident's note and discussed the case with the resident.


I agree with the resident's findings and plan as documented.














ASSESSMENT AND PLAN:





The patient is a 38 yo f w/ PMH hyperparathyroidism, ruptured ovarian cyst, 

migraines who was sent to the ED by her PCP for abdominal pain. A CT of the 

abdomen and pelvis showed inflammation in the terminal llium suspicious for 

inflammatory bowel disease. 





#peripheral eosinophilia--1000 approx.


   ? related to infectious vs inflammatory vs allergiv process


check stoolova and parasitex x3 , chek strongyloides ab, IgE level


Check Quantiferon, ESR, CRP, RAJESH, RF


will need to follow up out patietn --contact nos. given


discussed with patient and her mother